# Patient Record
Sex: FEMALE | Race: WHITE | NOT HISPANIC OR LATINO | Employment: UNEMPLOYED | URBAN - NONMETROPOLITAN AREA
[De-identification: names, ages, dates, MRNs, and addresses within clinical notes are randomized per-mention and may not be internally consistent; named-entity substitution may affect disease eponyms.]

---

## 2017-05-17 ENCOUNTER — OFFICE VISIT (OUTPATIENT)
Dept: PULMONOLOGY | Facility: CLINIC | Age: 77
End: 2017-05-17

## 2017-05-17 VITALS
HEIGHT: 66 IN | OXYGEN SATURATION: 98 % | BODY MASS INDEX: 22.82 KG/M2 | RESPIRATION RATE: 16 BRPM | DIASTOLIC BLOOD PRESSURE: 68 MMHG | HEART RATE: 78 BPM | WEIGHT: 142 LBS | SYSTOLIC BLOOD PRESSURE: 108 MMHG

## 2017-05-17 DIAGNOSIS — J30.89 OTHER ALLERGIC RHINITIS: ICD-10-CM

## 2017-05-17 DIAGNOSIS — G25.81 RESTLESS LEG SYNDROME: ICD-10-CM

## 2017-05-17 DIAGNOSIS — D86.9 SARCOIDOSIS: Primary | ICD-10-CM

## 2017-05-17 PROCEDURE — 99213 OFFICE O/P EST LOW 20 MIN: CPT | Performed by: NURSE PRACTITIONER

## 2017-05-17 RX ORDER — PRAMIPEXOLE DIHYDROCHLORIDE 1 MG/1
1 TABLET ORAL NIGHTLY
Qty: 90 TABLET | Refills: 3 | Status: SHIPPED | OUTPATIENT
Start: 2017-05-17 | End: 2017-09-06 | Stop reason: SDUPTHER

## 2017-05-17 RX ORDER — PREDNISONE 1 MG/1
5 TABLET ORAL DAILY
Qty: 90 TABLET | Refills: 3 | Status: SHIPPED | OUTPATIENT
Start: 2017-05-17 | End: 2017-09-06 | Stop reason: SDUPTHER

## 2017-05-25 RX ORDER — FLUTICASONE PROPIONATE 50 MCG
SPRAY, SUSPENSION (ML) NASAL
Qty: 1 BOTTLE | Refills: 3 | Status: SHIPPED | OUTPATIENT
Start: 2017-05-25 | End: 2018-05-14 | Stop reason: SDUPTHER

## 2017-09-06 ENCOUNTER — OFFICE VISIT (OUTPATIENT)
Dept: PULMONOLOGY | Facility: CLINIC | Age: 77
End: 2017-09-06

## 2017-09-06 VITALS
DIASTOLIC BLOOD PRESSURE: 60 MMHG | HEART RATE: 70 BPM | WEIGHT: 141 LBS | OXYGEN SATURATION: 97 % | RESPIRATION RATE: 16 BRPM | HEIGHT: 66 IN | SYSTOLIC BLOOD PRESSURE: 110 MMHG | BODY MASS INDEX: 22.66 KG/M2

## 2017-09-06 DIAGNOSIS — G25.81 RESTLESS LEG SYNDROME: ICD-10-CM

## 2017-09-06 DIAGNOSIS — D86.9 SARCOIDOSIS: Primary | ICD-10-CM

## 2017-09-06 PROCEDURE — 99213 OFFICE O/P EST LOW 20 MIN: CPT | Performed by: INTERNAL MEDICINE

## 2017-09-06 RX ORDER — POTASSIUM CHLORIDE 20 MEQ/1
TABLET, EXTENDED RELEASE ORAL
COMMUNITY
Start: 2017-07-12

## 2017-09-06 RX ORDER — PREDNISONE 10 MG/1
10 TABLET ORAL DAILY
Qty: 100 TABLET | Refills: 1 | Status: SHIPPED | OUTPATIENT
Start: 2017-09-06 | End: 2017-11-15 | Stop reason: SDUPTHER

## 2017-09-06 RX ORDER — PRAMIPEXOLE DIHYDROCHLORIDE 1 MG/1
1 TABLET ORAL NIGHTLY
Qty: 90 TABLET | Refills: 3 | Status: SHIPPED | OUTPATIENT
Start: 2017-09-06 | End: 2017-11-15 | Stop reason: SDUPTHER

## 2017-09-06 RX ORDER — PREDNISONE 10 MG/1
40 TABLET ORAL DAILY
COMMUNITY
Start: 2017-08-02 | End: 2017-09-06 | Stop reason: SDUPTHER

## 2017-09-06 NOTE — PROGRESS NOTES
"Chief Complaint   Patient presents with   • Follow-up     Sarcoidosis         Subjective   Isela Whitaker is a 76 y.o. female.     History of Present Illness   Patient comes in today to follow-up on sarcoidosis and restless leg syndrome.    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  She says that her prednisone was recently increased to 40 mg, after the calcium level was 10.4.  She denies any worsening symptoms such as abdominal pain, headache, joint pain etc.  She continued to deny any issues with breathing.    She is using her Mirapex regularly to very good effect.    The following portions of the patient's history were reviewed and updated as appropriate: allergies, current medications, past family history, past medical history, past social history and past surgical history.    Review of Systems   Constitutional: Positive for fatigue. Negative for chills and fever.   HENT: Positive for postnasal drip and rhinorrhea. Negative for sinus pressure, sneezing and sore throat.    Respiratory: Positive for cough. Negative for chest tightness, shortness of breath and wheezing.    Psychiatric/Behavioral: Negative for sleep disturbance.       Objective   Visit Vitals   • /60   • Pulse 70   • Resp 16   • Ht 66\" (167.6 cm)   • Wt 141 lb (64 kg)   • SpO2 97%   • BMI 22.76 kg/m2       Physical Exam   Constitutional: She is oriented to person, place, and time. She appears well-developed and well-nourished.   Eyes: EOM are normal. Pupils are equal, round, and reactive to light.   Neck: Normal range of motion. Neck supple.   Cardiovascular: Normal rate and regular rhythm.    Pulmonary/Chest: Effort normal. No respiratory distress. She has no wheezes.   Neurological: She is alert and oriented to person, place, and time.       Assessment/Plan   Isela was seen today for follow-up.    Diagnoses and all orders for this visit:    Sarcoidosis  -     Cancel: Basic Metabolic Panel; Future  -     Basic " Metabolic Panel; Future    Restless leg syndrome    Other orders  -     pramipexole (MIRAPEX) 1 MG tablet; Take 1 tablet by mouth Every Night.  -     predniSONE (DELTASONE) 10 MG tablet; Take 1 tablet by mouth Daily.           Return in about 10 weeks (around 11/15/2017) for Recheck, Overbook, Labs.    DISCUSSION (if any):  Continue Mirapex for restless leg syndrome.    I have reviewed her calcium level from yesterday and it is now 9.9.  I will definitely decrease the prednisone to 20 mg.  I tried to stay on 20 mg for the next 4 weeks.  We will repeat the calcium level at that time.  After 4 weeks we will try to decrease the prednisone to 10 mg and then reassess her clinically upon follow-up visit.      Dictated utilizing Dragon dictation.    This document was electronically signed by Hever Elaine MD September 6, 2017  1:50 PM

## 2017-11-15 ENCOUNTER — OFFICE VISIT (OUTPATIENT)
Dept: PULMONOLOGY | Facility: CLINIC | Age: 77
End: 2017-11-15

## 2017-11-15 VITALS
SYSTOLIC BLOOD PRESSURE: 112 MMHG | HEIGHT: 66 IN | RESPIRATION RATE: 16 BRPM | DIASTOLIC BLOOD PRESSURE: 60 MMHG | OXYGEN SATURATION: 99 % | WEIGHT: 150 LBS | BODY MASS INDEX: 24.11 KG/M2 | HEART RATE: 67 BPM

## 2017-11-15 DIAGNOSIS — G25.81 RESTLESS LEG SYNDROME: ICD-10-CM

## 2017-11-15 DIAGNOSIS — D86.9 SARCOIDOSIS: Primary | ICD-10-CM

## 2017-11-15 PROCEDURE — 99213 OFFICE O/P EST LOW 20 MIN: CPT | Performed by: INTERNAL MEDICINE

## 2017-11-15 RX ORDER — PRAMIPEXOLE DIHYDROCHLORIDE 1 MG/1
1.5 TABLET ORAL NIGHTLY
Qty: 90 TABLET | Refills: 2 | Status: SHIPPED | OUTPATIENT
Start: 2017-11-15 | End: 2017-11-27 | Stop reason: SDUPTHER

## 2017-11-15 RX ORDER — PREDNISONE 1 MG/1
7.5 TABLET ORAL DAILY
Qty: 45 TABLET | Refills: 3 | Status: SHIPPED | OUTPATIENT
Start: 2017-11-15 | End: 2017-11-27 | Stop reason: SDUPTHER

## 2017-11-15 NOTE — PROGRESS NOTES
"Chief Complaint   Patient presents with   • Follow-up     Sarcoidosis         Subjective   Isela Whitaker is a 77 y.o. female.     History of Present Illness   Comes in today to follow-up on sarcoidosis and restless leg syndrome.    Denies any significant issues.    Underwent a recent calcium level checked which was reportedly normal.    The following portions of the patient's history were reviewed and updated as appropriate: allergies, current medications, past family history, past medical history, past social history and past surgical history.    Review of Systems   Constitutional: Positive for fatigue. Negative for chills and fever.   HENT: Positive for postnasal drip and rhinorrhea. Negative for sinus pressure, sneezing and sore throat.    Respiratory: Positive for cough and shortness of breath. Negative for chest tightness and wheezing.    Psychiatric/Behavioral: Negative for sleep disturbance.       Objective   Visit Vitals   • /60   • Pulse 67   • Resp 16   • Ht 66\" (167.6 cm)   • Wt 150 lb (68 kg)   • SpO2 99%   • BMI 24.21 kg/m2       Physical Exam   Constitutional: She is oriented to person, place, and time. She appears well-developed and well-nourished.   Eyes: EOM are normal. Pupils are equal, round, and reactive to light.   Neck: Normal range of motion. Neck supple.   Cardiovascular: Normal rate and regular rhythm.    Pulmonary/Chest: Effort normal. No respiratory distress. She has no wheezes.   Neurological: She is alert and oriented to person, place, and time.       Assessment/Plan   Isela was seen today for follow-up.    Diagnoses and all orders for this visit:    Sarcoidosis  -     Basic Metabolic Panel; Future    Restless leg syndrome    Other orders  -     predniSONE (DELTASONE) 5 MG tablet; Take 1.5 tablets by mouth Daily.  -     pramipexole (MIRAPEX) 1 MG tablet; Take 1.5 tablets by mouth Every Night.         Return in about 6 months (around 5/15/2018) for Recheck.    DISCUSSION (if " any):  She is on fairly well as far as her calcium levels are concerned, and I will decrease the dose of prednisone to 7-1/2 mg every day.    Based on her response, we will try to decrease the dose even further upon follow-up visit.    I've asked her to continue taking Mirapex on a regular basis.       Dictated utilizing Dragon dictation.    This document was electronically signed by Hever Elaine MD November 15, 2017  2:19 PM

## 2017-11-27 RX ORDER — PRAMIPEXOLE DIHYDROCHLORIDE 1 MG/1
1.5 TABLET ORAL NIGHTLY
Qty: 90 TABLET | Refills: 2 | Status: SHIPPED | OUTPATIENT
Start: 2017-11-27 | End: 2018-02-22 | Stop reason: SDUPTHER

## 2017-11-27 RX ORDER — PREDNISONE 1 MG/1
7.5 TABLET ORAL DAILY
Qty: 45 TABLET | Refills: 3 | Status: SHIPPED | OUTPATIENT
Start: 2017-11-27 | End: 2018-03-05 | Stop reason: SDUPTHER

## 2018-02-02 DIAGNOSIS — D86.9 SARCOIDOSIS: ICD-10-CM

## 2018-02-23 RX ORDER — PRAMIPEXOLE DIHYDROCHLORIDE 1 MG/1
TABLET ORAL
Qty: 135 TABLET | Refills: 0 | Status: SHIPPED | OUTPATIENT
Start: 2018-02-23 | End: 2018-05-14 | Stop reason: SDUPTHER

## 2018-03-06 RX ORDER — PREDNISONE 1 MG/1
TABLET ORAL
Qty: 45 TABLET | Refills: 2 | Status: SHIPPED | OUTPATIENT
Start: 2018-03-06 | End: 2018-05-14 | Stop reason: SDUPTHER

## 2018-05-14 ENCOUNTER — OFFICE VISIT (OUTPATIENT)
Dept: PULMONOLOGY | Facility: CLINIC | Age: 78
End: 2018-05-14

## 2018-05-14 VITALS
BODY MASS INDEX: 25.49 KG/M2 | WEIGHT: 158.6 LBS | HEART RATE: 71 BPM | HEIGHT: 66 IN | DIASTOLIC BLOOD PRESSURE: 60 MMHG | SYSTOLIC BLOOD PRESSURE: 110 MMHG | OXYGEN SATURATION: 97 % | RESPIRATION RATE: 17 BRPM

## 2018-05-14 DIAGNOSIS — D86.9 SARCOIDOSIS: Primary | ICD-10-CM

## 2018-05-14 DIAGNOSIS — J30.89 OTHER ALLERGIC RHINITIS: ICD-10-CM

## 2018-05-14 DIAGNOSIS — G25.81 RESTLESS LEG SYNDROME: ICD-10-CM

## 2018-05-14 PROCEDURE — 99213 OFFICE O/P EST LOW 20 MIN: CPT | Performed by: INTERNAL MEDICINE

## 2018-05-14 RX ORDER — PREDNISONE 1 MG/1
7.5 TABLET ORAL DAILY
Qty: 135 TABLET | Refills: 3 | Status: SHIPPED | OUTPATIENT
Start: 2018-05-14

## 2018-05-14 RX ORDER — FLUTICASONE PROPIONATE 50 MCG
1 SPRAY, SUSPENSION (ML) NASAL DAILY
Qty: 3 BOTTLE | Refills: 3 | Status: SHIPPED | OUTPATIENT
Start: 2018-05-14 | End: 2018-12-11

## 2018-05-14 RX ORDER — PRAMIPEXOLE DIHYDROCHLORIDE 1 MG/1
1 TABLET ORAL
Qty: 90 TABLET | Refills: 3 | Status: SHIPPED | OUTPATIENT
Start: 2018-05-14

## 2018-05-14 RX ORDER — AMOXICILLIN AND CLAVULANATE POTASSIUM 500; 125 MG/1; MG/1
TABLET, FILM COATED ORAL
COMMUNITY
Start: 2018-05-11 | End: 2018-12-11

## 2018-05-14 NOTE — PROGRESS NOTES
"Chief Complaint   Patient presents with   • Follow-up     Sarcoidosis         Subjective   Isela Whitaker is a 77 y.o. female.     History of Present Illness   Patient comes in today to follow-up on sarcoidosis, restless leg syndrome and allergic rhinitis.    The patient says that her sarcoidosis has mostly remained stable and as per her primary care provider, her last calcium level was normal.    She continues to take prednisone at 7/2 mg daily.    She continues to require Flonase at least 4-5 times a week.    The patient is using Mirapex on a nightly basis for her restless leg syndrome with good control of symptoms.    The following portions of the patient's history were reviewed and updated as appropriate: allergies, current medications, past family history, past medical history, past social history and past surgical history.    Review of Systems   Constitutional: Positive for fatigue and fever.   HENT: Positive for congestion. Negative for sinus pressure, sneezing and sore throat.    Respiratory: Positive for cough and shortness of breath. Negative for chest tightness.    Psychiatric/Behavioral: Positive for sleep disturbance.       Objective   Visit Vitals  /60   Pulse 71   Resp 17   Ht 167.6 cm (65.98\")   Wt 71.9 kg (158 lb 9.6 oz)   SpO2 97%   BMI 25.61 kg/m²       Physical Exam   Constitutional: She is oriented to person, place, and time. She appears well-developed and well-nourished.   Eyes: EOM are normal. Pupils are equal, round, and reactive to light.   Neck: Normal range of motion. Neck supple.   Cardiovascular: Normal rate and regular rhythm.    Pulmonary/Chest: Effort normal. No respiratory distress. She has no wheezes.   Neurological: She is alert and oriented to person, place, and time.       Assessment/Plan   Isela was seen today for follow-up.    Diagnoses and all orders for this visit:    Sarcoidosis    Restless leg syndrome    Other allergic rhinitis    Other orders  -     pramipexole " (MIRAPEX) 1 MG tablet; Take 1 tablet by mouth every night at bedtime.  -     fluticasone (FLONASE) 50 MCG/ACT nasal spray; 1 spray into each nostril Daily.  -     predniSONE (DELTASONE) 5 MG tablet; Take 1.5 tablets by mouth Daily.         Return in about 5 months (around 10/14/2018) for Recheck.    DISCUSSION (if any):  I've made no changes to prednisone at 7.5 mg, especially given the fact that the last time prednisone was changed to 5 mg dose, her calcium started increasing.  Her current calcium is 9.8 as of laboratory workup done on 6 March 2018.    I will try to obtain her most recent laboratory workup which was performed within the past 2-3 days, as per the patient for her primary care provider.    No changes to Flonase and Mirapex.      Dictated utilizing Dragon dictation.    This document was electronically signed by Hever Elaine MD May 14, 2018  10:54 AM

## 2018-12-11 ENCOUNTER — OFFICE VISIT (OUTPATIENT)
Dept: PULMONOLOGY | Facility: CLINIC | Age: 78
End: 2018-12-11

## 2018-12-11 VITALS
OXYGEN SATURATION: 92 % | SYSTOLIC BLOOD PRESSURE: 110 MMHG | HEART RATE: 78 BPM | HEIGHT: 66 IN | WEIGHT: 153 LBS | DIASTOLIC BLOOD PRESSURE: 62 MMHG | BODY MASS INDEX: 24.59 KG/M2

## 2018-12-11 DIAGNOSIS — J30.89 OTHER ALLERGIC RHINITIS: ICD-10-CM

## 2018-12-11 DIAGNOSIS — G25.81 RESTLESS LEG SYNDROME: ICD-10-CM

## 2018-12-11 DIAGNOSIS — D86.9 SARCOIDOSIS: Primary | ICD-10-CM

## 2018-12-11 PROCEDURE — 99214 OFFICE O/P EST MOD 30 MIN: CPT | Performed by: NURSE PRACTITIONER

## 2018-12-11 PROCEDURE — 95012 NITRIC OXIDE EXP GAS DETER: CPT | Performed by: NURSE PRACTITIONER

## 2018-12-11 RX ORDER — AZELASTINE 1 MG/ML
1 SPRAY, METERED NASAL 2 TIMES DAILY PRN
Qty: 1 EACH | Refills: 5 | Status: SHIPPED | OUTPATIENT
Start: 2018-12-11

## 2018-12-11 RX ORDER — NYSTATIN 100000 U/G
CREAM TOPICAL
COMMUNITY
Start: 2018-11-30

## 2018-12-11 RX ORDER — PANTOPRAZOLE SODIUM 40 MG/1
40 TABLET, DELAYED RELEASE ORAL DAILY
Qty: 90 TABLET | Refills: 1 | Status: SHIPPED | OUTPATIENT
Start: 2018-12-11

## 2018-12-11 NOTE — PROGRESS NOTES
Chief Complaint   Patient presents with   • Follow-up   • Sarcoidosis         Subjective   Islea Whitaker is a 78 y.o. female.     History of Present Illness   The patient comes in today for follow-up of sarcoidosis, allergic rhinitis, and restless leg syndrome.    She has been sick over the last month.  She has had a sore throat and had an appointment with Dr. Ashley for regular checkup during that appointment he gave her a prescription for Levaquin for 7 days.  She finished all of this medication and her symptoms were not any better.  She continued to have the same symptoms along with a low-grade fever and  went to an urgent treatment Center and was given a steroid injection and antibiotics.  After starting this therapy she initially felt better for 2 days and then the symptoms returned. The symptoms that returned included a sore throat as well as a cough and postnasal drainage that was specifically a problem in the morning.  She went to her primary care provider's office and saw the PA who referred her to an allergist in [place].  She was not overwhelmingly pleased with the allergist.      She follows with ENT and they discontinued Flonase and Zyrtec.  She continues to have dribbling from the nose as well as postnasal drainage.  She has also felt some chest congestion for the last month as well.    She also complains of a pain in the epigastric area that comes and goes.  She cannot identify any specific activity that brings it on.  When asked if it bothers her more after eating or with activity such as bending over she states it's possible.  This pain has been intermittent for a couple of months.  She has forgotten to mention it to her PCP.    She takes Mirapex for restless leg syndrome and is controlling the symptoms very well.    The following portions of the patient's history were reviewed and updated as appropriate: allergies, current medications, past family history, past medical history, past social  "history and past surgical history.    Review of Systems   Constitutional: Positive for fever. Negative for chills.   HENT: Positive for sore throat. Negative for sinus pressure and sneezing.    Respiratory: Positive for cough. Negative for shortness of breath and wheezing.        Objective   Visit Vitals  /62 (BP Location: Right arm, Patient Position: Sitting, Cuff Size: Adult)   Pulse 78   Ht 167.6 cm (65.98\")   Wt 69.4 kg (153 lb)   SpO2 92%   BMI 24.71 kg/m²     Physical Exam   Constitutional: She is oriented to person, place, and time. She appears well-developed and well-nourished.   HENT:   Head: Normocephalic and atraumatic.   Mild drainage of the oropharynx.   Eyes: EOM are normal.   Neck: Neck supple.   Cardiovascular: Normal rate and regular rhythm.   Pulmonary/Chest: Effort normal. No respiratory distress.   Somewhat decreased A/E without wheezing noted.  No crackles or rales noted.   Musculoskeletal: She exhibits no edema.   Gait normal.   Neurological: She is alert and oriented to person, place, and time.   Skin: Skin is warm and dry.   Psychiatric: She has a normal mood and affect.   Vitals reviewed.          Assessment/Plan   Isela was seen today for follow-up and sarcoidosis.    Diagnoses and all orders for this visit:    Sarcoidosis  -     Nitric Oxide    Restless leg syndrome    Other allergic rhinitis    Other orders  -     pantoprazole (PROTONIX) 40 MG EC tablet; Take 1 tablet by mouth Daily.  -     azelastine (ASTELIN) 0.1 % nasal spray; 1 spray into the nostril(s) as directed by provider 2 (Two) Times a Day As Needed for Rhinitis or Allergies. Use in each nostril as directed           Return in about 3 months (around 3/11/2019) for ON A FRIDAY IN [place], For Dr. Elaine.    DISCUSSION (if any):   She is on chronic steroids so I have asked her to Double prednisone for 5 days to see if this helps with the chest congestion and shortness of breath.  She is currently on a 7-1/2 mg daily dose so I " have asked her to take 15 mg a day for the next 5 days.    She continues to have symptoms consistent with uncontrolled allergic rhinitis so I have added Astelin prn.   The ENT has taken her off of Flonase and Zyrtec due to the possibility of becoming too dry however she does continue to have drainage so I have encouraged her to try the Astelin and only use as needed when the nasal drainage is worse.    I have added protonix for the intermittent pain in the epigastric region. It is possible with prolonged steroid use that she may have an ulcer. I have also asked her to discuss the symptoms with her primary care provider and let her know that Protonix has been prescribed.    Dictated utilizing Dragon dictation.    This document was electronically signed by EDIS Leiva December 11, 2018  2:32 PM

## 2018-12-19 RX ORDER — PREDNISONE 20 MG/1
TABLET ORAL
Qty: 10 TABLET | Refills: 0 | Status: SHIPPED | OUTPATIENT
Start: 2018-12-19

## 2019-02-19 RX ORDER — PREDNISONE 1 MG/1
TABLET ORAL
Qty: 135 TABLET | Refills: 3 | OUTPATIENT
Start: 2019-02-19

## 2019-02-19 RX ORDER — PANTOPRAZOLE SODIUM 40 MG/1
40 TABLET, DELAYED RELEASE ORAL DAILY
Qty: 90 TABLET | Refills: 1 | OUTPATIENT
Start: 2019-02-19